# Patient Record
(demographics unavailable — no encounter records)

---

## 2025-01-30 NOTE — PHYSICAL EXAM
[General Appearance - Alert] : alert [General Appearance - In No Acute Distress] : in no acute distress [General Appearance - Well Nourished] : well nourished [General Appearance - Well Developed] : well developed [General Appearance - Well-Appearing] : healthy appearing [Sclera] : the sclera and conjunctiva were normal [Extraocular Movements] : extraocular movements were intact [Outer Ear] : the ears and nose were normal in appearance [Neck Appearance] : the appearance of the neck was normal [Jugular Venous Distention Increased] : there was no jugular-venous distention [Respiration, Rhythm And Depth] : normal respiratory rhythm and effort [Auscultation Breath Sounds / Voice Sounds] : lungs were clear to auscultation bilaterally [Heart Rate And Rhythm] : heart rate was normal and rhythm regular [Heart Sounds] : normal S1 and S2 [Arterial Pulses Carotid] : carotid pulses were normal with no bruits [Bowel Sounds] : normal bowel sounds [Abdomen Soft] : soft [Abdomen Tenderness] : non-tender [] : no hepato-splenomegaly [No CVA Tenderness] : no ~M costovertebral angle tenderness [Abnormal Walk] : normal gait [Skin Color & Pigmentation] : normal skin color and pigmentation [Sensation] : the sensory exam was normal to light touch and pinprick [No Focal Deficits] : no focal deficits [Oriented To Time, Place, And Person] : oriented to person, place, and time [Impaired Insight] : insight and judgment were intact [Affect] : the affect was normal [Mood] : the mood was normal

## 2025-01-31 NOTE — ASSESSMENT
[FreeTextEntry1] : 1. CKD-4 - creatinine relatively stable at 2.15 but with BUN elevated to 57.  Discussed adequate hydration.  Repeat labs ordered for 4 weeks and before next visit.    2. Anemia - hemoglobin at 9.7 gm/dl.  Patient continues follow up with Hematology for management.  She noted continuation of oral iron.  Tsat stable at 17%.  Repeat 4 weeks and prior to next visit.  3. Hypomagnesemia - level at 1.5.  Will continue with supplements.  Has had tolerance issues to larger supplement dosing.  Chronically fluctuates with levels subtherapeutic to normal.  Repeat levels 4 weeks.   4. Hypocalcemia - level 8.4.  Continue to monitor with repeat labs.  Further recommendations per course.   5. Hyperkalemia - recent level at 4.5.  Monitor with labs.  6. Proteinuria -spot urine at 300 mg per day.  Repeat prior to next visit.  7. Metabolic acidosis - level of 23 with oral sodium bicarbonate replacement.  8. Lower extremity edema - improved.   Follows with Urology for stone and cysts.   Reviewed above with patient. Follow up 4 months.

## 2025-01-31 NOTE — HISTORY OF PRESENT ILLNESS
[FreeTextEntry1] : Patient with history of CKD, varying between stage 3 and 4 for about 10 years but now stage 4.  She has history of diabetes mellitus but has been with minimal proteinuria.  History also included bilateral renal cysts with bilateral cortical thinning on ultrasound, accompanied by bilateral echogenicity.    She has low grade proteinuria and past mild anemia consistent with her CKD.  Followed by GI with diagnosis of nonalcoholic fatty liver disease/cirrhosis.  Remains on oral magnesium for hypomagnesemia. She follows with Urology for nephrolithiasis.  Empirically treated as uric acid stones.  Has noted some persistent diarrhea episodes with normal colonoscopy.  Labs previously showed hypocalcemia and persistent hypomagnesemia despite supplementation efforts.  Interval history includes the patient being hospitalized for sepsis and possible infection.  She described emergent surgery for possible bowel ischemia (records pending). No complaints offered at today's visit.   Renal ultrasound - 1/9/19 - right 6 cm, left 8.5 cm, bilateral renal cysts, left nonobstructing stones.                               1/7/20 right 6.5 cm, left 8.5 cm, bilateral renal cysts and 2 calculi left (1.2 and .7)                               6/16/20 - right 7.6 cm, left 9.2 cm, bilateral renal cysts, calculi left up to 1.4 cm. MRI                      - 5/2022 bilateral renal cysts and 6 mm stone in left renal pelvis, nonobstructive.

## 2025-01-31 NOTE — REVIEW OF SYSTEMS
[Negative] : Heme/Lymph [FreeTextEntry9] : pain in left wrist/forearm for which imaging currently pending.

## 2025-07-09 NOTE — PHYSICAL EXAM
[General Appearance - Alert] : alert [General Appearance - In No Acute Distress] : in no acute distress [General Appearance - Well Nourished] : well nourished [General Appearance - Well Developed] : well developed [General Appearance - Well-Appearing] : healthy appearing [Sclera] : the sclera and conjunctiva were normal [Extraocular Movements] : extraocular movements were intact [Outer Ear] : the ears and nose were normal in appearance [Neck Appearance] : the appearance of the neck was normal [Jugular Venous Distention Increased] : there was no jugular-venous distention [Respiration, Rhythm And Depth] : normal respiratory rhythm and effort [Auscultation Breath Sounds / Voice Sounds] : lungs were clear to auscultation bilaterally [Heart Rate And Rhythm] : heart rate was normal and rhythm regular [Heart Sounds] : normal S1 and S2 [Arterial Pulses Carotid] : carotid pulses were normal with no bruits [Bowel Sounds] : normal bowel sounds [Abdomen Soft] : soft [Abdomen Tenderness] : non-tender [] : no hepato-splenomegaly [No CVA Tenderness] : no ~M costovertebral angle tenderness [Abnormal Walk] : normal gait [Skin Color & Pigmentation] : normal skin color and pigmentation [Sensation] : the sensory exam was normal to light touch and pinprick [No Focal Deficits] : no focal deficits [Oriented To Time, Place, And Person] : oriented to person, place, and time [Impaired Insight] : insight and judgment were intact [Affect] : the affect was normal [Mood] : the mood was normal [FreeTextEntry1] : trace edema

## 2025-07-09 NOTE — ASSESSMENT
[FreeTextEntry1] : 1. CKD-4 - creatinine at 2.57 but with BUN elevated to 57.  Discussed adequate hydration.  Repeat labs ordered for 4 weeks and before next visit.  Would prefer patient off of Omeprazole.  She is followed by Dr. Alvarado and will review with him.  2. Anemia - hemoglobin at 10.0 gm/dl.  Patient continues follow up with Hematology for management.  She noted continuation of oral iron.  Repeat prior to next visit.  3. Hypomagnesemia - level at 1.5.  Will continue with supplements.  Has had tolerance issues to larger supplement dosing.  Chronically fluctuates with levels subtherapeutic to normal.  Omeprazole may be contributing to treatment resistance.  Repeat prior to next visit.    4. Hypocalcemia - level 9.0.  Further recommendations per course.   5. Hyperkalemia - recent level at 4.9.  Monitor with labs.  6. Proteinuria -spot urine at 200 mg per day.  Repeat prior to next visit.  7. Metabolic acidosis - level of 20 with oral sodium bicarbonate replacement continuing.  Patient voiced compliance with regimen.  8. Lower extremity edema - improved.   Follows with Urology for stone and cysts.   Reviewed above with patient. Follow up October 2025.

## 2025-07-09 NOTE — HISTORY OF PRESENT ILLNESS
[FreeTextEntry1] : Patient with history of CKD, varying between stage 3 and 4 for about 10 years but now stage 4.  She has history of diabetes mellitus but has been with minimal proteinuria.  History also included bilateral renal cysts with bilateral cortical thinning on ultrasound, accompanied by bilateral echogenicity.    She has low grade proteinuria and past mild anemia consistent with her CKD.  Followed by GI with diagnosis of nonalcoholic fatty liver disease/cirrhosis.  Remains on oral magnesium for hypomagnesemia. She follows with Urology for nephrolithiasis.  Empirically treated as uric acid stones.  Has noted some persistent diarrhea episodes with normal colonoscopy.  Labs previously showed hypocalcemia and persistent hypomagnesemia despite supplementation efforts.  Past history includes the patient being hospitalized for sepsis and possible infection.  She described emergent surgery for possible bowel ischemia. No new complaints offered at today's visit.   Renal ultrasound - 1/9/19 - right 6 cm, left 8.5 cm, bilateral renal cysts, left nonobstructing stones.                               1/7/20 right 6.5 cm, left 8.5 cm, bilateral renal cysts and 2 calculi left (1.2 and .7)                               6/16/20 - right 7.6 cm, left 9.2 cm, bilateral renal cysts, calculi left up to 1.4 cm. MRI                      - 5/2022 bilateral renal cysts and 6 mm stone in left renal pelvis, nonobstructive.